# Patient Record
Sex: MALE | Race: WHITE | Employment: OTHER
[De-identification: names, ages, dates, MRNs, and addresses within clinical notes are randomized per-mention and may not be internally consistent; named-entity substitution may affect disease eponyms.]

---

## 2017-12-17 ENCOUNTER — HEALTH MAINTENANCE LETTER (OUTPATIENT)
Age: 67
End: 2017-12-17

## 2019-01-04 ENCOUNTER — OFFICE VISIT (OUTPATIENT)
Dept: SURGERY | Facility: OTHER | Age: 69
End: 2019-01-04
Attending: SURGERY
Payer: COMMERCIAL

## 2019-01-04 VITALS
TEMPERATURE: 97.7 F | BODY MASS INDEX: 25.39 KG/M2 | OXYGEN SATURATION: 96 % | SYSTOLIC BLOOD PRESSURE: 120 MMHG | RESPIRATION RATE: 18 BRPM | HEIGHT: 66 IN | DIASTOLIC BLOOD PRESSURE: 70 MMHG | WEIGHT: 158 LBS | HEART RATE: 89 BPM

## 2019-01-04 DIAGNOSIS — F17.200 TOBACCO DEPENDENCE: Primary | ICD-10-CM

## 2019-01-04 DIAGNOSIS — Z86.0100 HISTORY OF COLONIC POLYPS: ICD-10-CM

## 2019-01-04 DIAGNOSIS — Z71.6 ENCOUNTER FOR TOBACCO USE CESSATION COUNSELING: ICD-10-CM

## 2019-01-04 DIAGNOSIS — Z12.11 SPECIAL SCREENING FOR MALIGNANT NEOPLASMS, COLON: ICD-10-CM

## 2019-01-04 PROCEDURE — 99203 OFFICE O/P NEW LOW 30 MIN: CPT | Performed by: SURGERY

## 2019-01-04 RX ORDER — SODIUM, POTASSIUM,MAG SULFATES 17.5-3.13G
2 SOLUTION, RECONSTITUTED, ORAL ORAL ONCE
Qty: 2 BOTTLE | Refills: 0 | Status: SHIPPED | OUTPATIENT
Start: 2019-01-04 | End: 2019-01-04

## 2019-01-04 ASSESSMENT — PAIN SCALES - GENERAL: PAINLEVEL: NO PAIN (0)

## 2019-01-04 ASSESSMENT — MIFFLIN-ST. JEOR: SCORE: 1434.43

## 2019-01-04 NOTE — PROGRESS NOTES
Surgery Consult Clinic Note      RE: Nguyễn Kiran  : 1950    Chief Complaint:  colonoscopy    History of Present Illness:  Mr. Kiran is a very pleasant 68 year old male who I am [facility] colon malignant neoplasm and consideration for colonoscopy.  He denies family history of colon or rectal cancer, blood in stool, changes in bowel habits, weight loss, abdominal pain. Last colonoscopy was in  and positive for 3 tubular adenomas (one small one on the right, one small one on the left and one large on in the rectum).  Abdominal surgeries include inguinal and abdominal hernia surgeries. He was diagnosed with uncomplicated diverticulitis in  and treated with oral antibiotics.  He specifically denies fever, chills, nausea, vomiting, chest pain, shortness of breath or palpitations.      Medical history:  Past Medical History:   Diagnosis Date     Nicotine dependence      Rectal bleeding        Surgical history:  Past Surgical History:   Procedure Laterality Date     ARTHROSCOPY SHOULDER ROTATOR CUFF REPAIR Right 2007     BACK SURGERY  1977     COLONOSCOPY N/A 10/6/2014    Procedure: COLONOSCOPY;  Surgeon: Talisha Hodges MD;  Location: [facility]     HERNIA REPAIR      right inguinal hernia repair     HERNIA REPAIR      site not specified     ORTHOPEDIC SURGERY      right shoulder rotator cuff repair     TONSILLECTOMY         Family history:  Family History   Problem Relation Age of Onset     Heart Disease Mother         heart valve replacement     Diabetes Father      Heart Disease Father         heart attack     Diabetes Brother        Medications:  Prior to Admission medications    Not on File       Allergies:  The patienthas No Known Allergies.  .  Social history:  Social History     Tobacco Use     Smoking status: Current Every Day Smoker     Packs/day: 1.00     Types: Cigarettes     Smokeless tobacco: Never Used     Tobacco comment: Has been  "smoking 45 years   Substance Use Topics     Alcohol use: No     Marital status: .    Review of Systems:    Constitutional: Negative for fever, chills and weight loss.   HENT: Negative for ear pain, nosebleeds, congestion, sore throat, tinnitus and ear discharge.    Eyes: Negative for blurred vision, double vision, photophobia and pain.   Respiratory: Negative for cough, hemoptysis, shortness of breath, wheezing and stridor.    Cardiovascular: Negative for chest pain, palpitations and orthopnea.   Gastrointestinal: Negative for heartburn, nausea, vomiting, abdominal pain and blood in stool.   Genitourinary: Negative for urgency, frequency and hematuria.   Musculoskeletal: Negative for myalgias, back pain and joint pain.   Neurological: Negative for tingling, speech change and headaches.   Endo/Heme/Allergies: Does not bruise/bleed easily.   Psychiatric/Behavioral: Negative for depression, suicidal ideas and hallucinations. The patient is not nervous/anxious.    Physical Examination:  /70 (BP Location: Left arm, Patient Position: Sitting, Cuff Size: Adult Regular)   Pulse 89   Temp 97.7  F (36.5  C) (Tympanic)   Resp 18   Ht 1.676 m (5' 6\")   Wt 71.7 kg (158 lb)   SpO2 96%   BMI 25.50 kg/m    General: AAOx4, NAD, WN/WD, ambulating without assistance  HEENT:NCAT, EOMI, PERRL Sclerae anicteric; Trachea mideline, no JVD  Chest:   Clear to auscultation bilaterally.  Cardiac: S1S2 , regular rate and rhythm without additional sounds  Abdomen: Soft, ND/NT no rebound, no guarding  Rectal: deferred until colonoscopy  Extremities: Cursory exam unremarkable.  Skin: Warm, dry, < 2 sec cap refill  Neuro: CN 2-12 grossly intact, no focal deficit, GCS 15  Psych: happy, calm, asks appropriate questions      Assessment/Plan:  #1 Screening colon malignant neoplasm  #2 Personal history of multiple adenomatous colon polyps  #3 Tobacco dependence     Thank you for the consult.  Mr. Kiran and I had a long and migue " discussion about colonoscopies.  The indications, risks, benefits, althernatives and technical aspects of whole colon colonoscopy were outlined with risks including, but not limited to, perforation, bleeding and inability to visualize entire colon.  Management of each was reviewed including the risk for life saving surgery and possible admittance to the ICU.  The need of mechanical preparation of the colon was reviewed along with the use of monitored anesthetic care which is needed to ensure proper visualization and safety concerns should biopsy be needed.  The patient's questions were asked and answered.  Scheduled first available date.          Dr. Frederic DO, [facility]  [address] [place] [address], [address]  [place]    [id number]    Referring Provider:  No referring provider defined for this encounter.     Primary Care Provider:  No Ref-Primary, Physician

## 2019-01-04 NOTE — PATIENT INSTRUCTIONS
Thank you for allowing Dr De La Garza and our surgical team to participate in your care.  If you have a scheduling or an appointment question please contact Bibi, our Health Unit Coordinator, at her direct line [phone].   ALL nursing questions or concerns can be directed to your surgical nurse at: [phone]    You are scheduled for a: Colonoscopy with possible biopsy   Your procedure date is:  Tuesday, January 15, 2019    You have been ordered Suprep as your mechanical bowel prep. Please pick this up from your preferred pharmacy.     Suprep Bowel Prep Instructions    Clear liquid diet only the day prior to your examination.    Clear liquids include:  Water, tea, coffee, soda pop, gatorade, clear nutritional drinks, jell-o, popsicles (no milk or fruit pieces), sorbet, fat-free soup broth or bouillon, plain hard candy (clear life savers), clear juices and fruit flavored drinks such as apple juice, white grape juice, hi-c and emily-aid.  Please avoid red or purple colors.      Oscar prep - one bottle at 6pm the evening prior to the examination and follow with Two 16 ounce glasses water.    Oscar prep - one bottle 6 hours prior to your arrival time.     You may continue to drink clear liquids until 3 hours prior to your arrival time at the hospital.        HOW TO PREPARE-        You need a friend or family member available to drive you home AND stay with you for 4 hours after you leave the hospital. You will not be allowed to drive yourself. IF you need to take a taxi or the bus you MUST have a responsible person to ride with you. YOUR PROCEDURE WILL BE CANCELLED IF YOU DO NOT HAVE A RESPONSIBLE ADULT TO DRIVE YOU HOME.       You need to call our Surgery Education Nurses 1-2 weeks prior to your surgery date at [phone] or toll free [phone]. Please have you medication and allergy lists ready.       Stop your aspirin or other NSAIDs(Ibuprofen, Motrin, Aleve, Celebrex, Naproxen, etc...) 7 days before your surgery.  Tylenol is safe to take.        Hospital admitting will call you the day before your surgery with your arrival time. If you are scheduled on a Monday admitting will call you the Friday before.      Please call your primary care physician if you should become ill within 24 hours of scheduled surgery. (ex.vomiting, diarrhea, fever)

## 2019-01-04 NOTE — PROGRESS NOTES
"Chief Complaint   Patient presents with     Consult     [facility].  History of polyps.        Initial /70 (BP Location: Left arm, Patient Position: Sitting, Cuff Size: Adult Regular)   Pulse 89   Temp 97.7  F (36.5  C) (Tympanic)   Resp 18   Ht 1.676 m (5' 6\")   Wt 71.7 kg (158 lb)   SpO2 96%   BMI 25.50 kg/m   Estimated body mass index is 25.5 kg/m  as calculated from the following:    Height as of this encounter: 1.676 m (5' 6\").    Weight as of this encounter: 71.7 kg (158 lb).  Medication Reconciliation: complete    Susan Sanders LPN    "

## 2019-01-07 ENCOUNTER — TELEPHONE (OUTPATIENT)
Dept: SURGERY | Facility: OTHER | Age: 69
End: 2019-01-07

## 2019-01-07 DIAGNOSIS — Z12.11 SPECIAL SCREENING FOR MALIGNANT NEOPLASMS, COLON: Primary | ICD-10-CM

## 2019-01-07 RX ORDER — SODIUM, POTASSIUM,MAG SULFATES 17.5-3.13G
1 SOLUTION, RECONSTITUTED, ORAL ORAL SEE ADMIN INSTRUCTIONS
Qty: 2 BOTTLE | Refills: 0 | Status: SHIPPED | OUTPATIENT
Start: 2019-01-07 | End: 2021-05-21

## 2019-01-07 NOTE — TELEPHONE ENCOUNTER
Pt called stating unable to get [facility] except [facility]. States it can be overnighted from pharmacy.    FAX:[phone]    Concha Brito LPN

## 2019-01-08 DIAGNOSIS — Z12.11 SPECIAL SCREENING FOR MALIGNANT NEOPLASMS, COLON: Primary | ICD-10-CM

## 2019-01-14 ENCOUNTER — ANESTHESIA EVENT (OUTPATIENT)
Dept: SURGERY | Facility: HOSPITAL | Age: 69
End: 2019-01-14
Payer: COMMERCIAL

## 2019-01-14 ASSESSMENT — LIFESTYLE VARIABLES: TOBACCO_USE: 1

## 2019-01-14 NOTE — ANESTHESIA PREPROCEDURE EVALUATION
Anesthesia Pre-Procedure Evaluation    Patient: Nguyễn Kiran   MRN: [id number] : 1950          Preoperative Diagnosis: SCREENING    Procedure(s):  COLONOSCOPY    Past Medical History:   Diagnosis Date     Nicotine dependence      Rectal bleeding      Past Surgical History:   Procedure Laterality Date     ARTHROSCOPY SHOULDER ROTATOR CUFF REPAIR Right 2007     BACK SURGERY  1977     COLONOSCOPY N/A 10/6/2014    Procedure: COLONOSCOPY;  Surgeon: Talisha Hodges MD;  Location: [facility]     HERNIA REPAIR      right inguinal hernia repair     HERNIA REPAIR      site not specified     ORTHOPEDIC SURGERY      right shoulder rotator cuff repair     TONSILLECTOMY         Anesthesia Evaluation     . Pt has had prior anesthetic. Type: General and MAC    No history of anesthetic complications          ROS/MED HX    ENT/Pulmonary:     (+)tobacco use, Current use 1 packs/day  mild COPD, , . .    Neurologic:  - neg neurologic ROS     Cardiovascular:     (+) ----. : . . . :. . No previous cardiac testing       METS/Exercise Tolerance:     Hematologic:  - neg hematologic  ROS       Musculoskeletal:   (+) arthritis, , , -       GI/Hepatic:     (+) bowel prep, Other GI/Hepatic hx rectal bleeding      Renal/Genitourinary:  - ROS Renal section negative       Endo:  - neg endo ROS       Psychiatric:  - neg psychiatric ROS       Infectious Disease:  - neg infectious disease ROS       Malignancy:      - no malignancy   Other:    - neg other ROS                      Physical Exam      Airway   Mallampati: III  TM distance: >3 FB  Neck ROM: full    Dental   (+) missing    Cardiovascular   Rhythm and rate: regular and normal      Pulmonary    breath sounds clear to auscultation(+) decreased breath sounds               Lab Results   Component Value Date    WBC 6.7 10/07/2015    HGB 17.2 10/07/2015    HCT 48.8 10/07/2015     10/07/2015     10/07/2015    POTASSIUM 4.1 10/07/2015    CHLORIDE 105 10/07/2015    CO2 29  "10/07/2015    BUN 10 10/07/2015    CR 0.98 10/07/2015     (H) 10/07/2015    KWESI 8.5 10/07/2015       Preop Vitals  BP Readings from Last 3 Encounters:   01/04/19 120/70   10/13/15 110/80   10/07/15 116/100    Pulse Readings from Last 3 Encounters:   01/04/19 89   10/13/15 80   10/07/15 82      Resp Readings from Last 3 Encounters:   01/04/19 18   10/07/15 18   10/06/14 18    SpO2 Readings from Last 3 Encounters:   01/04/19 96%   10/07/15 96%   10/06/14 96%      Temp Readings from Last 1 Encounters:   01/04/19 97.7  F (36.5  C) (Tympanic)    Ht Readings from Last 1 Encounters:   01/04/19 1.676 m (5' 6\")      Wt Readings from Last 1 Encounters:   01/04/19 71.7 kg (158 lb)    Estimated body mass index is 25.5 kg/m  as calculated from the following:    Height as of 1/4/19: 1.676 m (5' 6\").    Weight as of 1/4/19: 71.7 kg (158 lb).       Anesthesia Plan      History & Physical Review  History and physical reviewed and following examination; no interval change.    ASA Status:  3 .        Plan for MAC with Intravenous and Propofol induction. Maintenance will be TIVA.  Reason for MAC:  Chronic cardiopulmonary disease (G9) and Other - see comments  PONV prophylaxis:  Ondansetron (or other 5HT-3)  Hp 1/4/19 berny  Surgeon requests deep sedation. Patient is an ASA 3 and has a Mallampati 3 airway. Will provide MAC.      Postoperative Care  Postoperative pain management:  IV analgesics.      Consents  Anesthetic plan, risks, benefits and alternatives discussed with:  Patient..                 WILDER Cleveladn CRNA  " Anemia

## 2019-01-15 ENCOUNTER — ANESTHESIA (OUTPATIENT)
Dept: SURGERY | Facility: HOSPITAL | Age: 69
End: 2019-01-15
Payer: COMMERCIAL

## 2019-01-15 ENCOUNTER — HOSPITAL ENCOUNTER (OUTPATIENT)
Facility: HOSPITAL | Age: 69
Discharge: HOME | End: 2019-01-15
Attending: SURGERY | Admitting: SURGERY
Payer: COMMERCIAL

## 2019-01-15 VITALS
OXYGEN SATURATION: 97 % | SYSTOLIC BLOOD PRESSURE: 151 MMHG | DIASTOLIC BLOOD PRESSURE: 77 MMHG | TEMPERATURE: 98.1 F | WEIGHT: 158 LBS | RESPIRATION RATE: 18 BRPM | HEIGHT: 66 IN | BODY MASS INDEX: 25.39 KG/M2

## 2019-01-15 PROCEDURE — 45380 COLONOSCOPY AND BIOPSY: CPT | Mod: PT | Performed by: SURGERY

## 2019-01-15 PROCEDURE — 37000009 ZZH ANESTHESIA TECHNICAL FEE, EACH ADDTL 15 MIN: Performed by: SURGERY

## 2019-01-15 PROCEDURE — 25000128 H RX IP 250 OP 636: Performed by: NURSE ANESTHETIST, CERTIFIED REGISTERED

## 2019-01-15 PROCEDURE — 01999 UNLISTED ANES PROCEDURE: CPT | Performed by: NURSE ANESTHETIST, CERTIFIED REGISTERED

## 2019-01-15 PROCEDURE — 36000052 ZZH SURGERY LEVEL 2 EA 15 ADDTL MIN: Performed by: SURGERY

## 2019-01-15 PROCEDURE — 40000305 ZZH STATISTIC PRE PROC ASSESS I: Performed by: SURGERY

## 2019-01-15 PROCEDURE — 36000050 ZZH SURGERY LEVEL 2 1ST 30 MIN: Performed by: SURGERY

## 2019-01-15 PROCEDURE — 45385 COLONOSCOPY W/LESION REMOVAL: CPT | Performed by: ANESTHESIOLOGY

## 2019-01-15 PROCEDURE — 88305 TISSUE EXAM BY PATHOLOGIST: CPT | Mod: TC | Performed by: SURGERY

## 2019-01-15 PROCEDURE — 71000027 ZZH RECOVERY PHASE 2 EACH 15 MINS: Performed by: SURGERY

## 2019-01-15 PROCEDURE — 25000125 ZZHC RX 250: Performed by: NURSE ANESTHETIST, CERTIFIED REGISTERED

## 2019-01-15 PROCEDURE — 45385 COLONOSCOPY W/LESION REMOVAL: CPT | Mod: PT | Performed by: SURGERY

## 2019-01-15 PROCEDURE — 37000008 ZZH ANESTHESIA TECHNICAL FEE, 1ST 30 MIN: Performed by: SURGERY

## 2019-01-15 PROCEDURE — 27210794 ZZH OR GENERAL SUPPLY STERILE: Performed by: SURGERY

## 2019-01-15 RX ORDER — ONDANSETRON 2 MG/ML
4 INJECTION INTRAMUSCULAR; INTRAVENOUS EVERY 30 MIN PRN
Status: DISCONTINUED | OUTPATIENT
Start: 2019-01-15 | End: 2019-01-15 | Stop reason: HOSPADM

## 2019-01-15 RX ORDER — SODIUM CHLORIDE, SODIUM LACTATE, POTASSIUM CHLORIDE, CALCIUM CHLORIDE 600; 310; 30; 20 MG/100ML; MG/100ML; MG/100ML; MG/100ML
INJECTION, SOLUTION INTRAVENOUS CONTINUOUS
Status: DISCONTINUED | OUTPATIENT
Start: 2019-01-15 | End: 2019-01-15 | Stop reason: HOSPADM

## 2019-01-15 RX ORDER — LIDOCAINE HYDROCHLORIDE 20 MG/ML
INJECTION, SOLUTION INFILTRATION; PERINEURAL PRN
Status: DISCONTINUED | OUTPATIENT
Start: 2019-01-15 | End: 2019-01-15

## 2019-01-15 RX ORDER — ONDANSETRON 4 MG/1
4 TABLET, ORALLY DISINTEGRATING ORAL EVERY 30 MIN PRN
Status: DISCONTINUED | OUTPATIENT
Start: 2019-01-15 | End: 2019-01-15 | Stop reason: HOSPADM

## 2019-01-15 RX ORDER — PROPOFOL 10 MG/ML
INJECTION, EMULSION INTRAVENOUS PRN
Status: DISCONTINUED | OUTPATIENT
Start: 2019-01-15 | End: 2019-01-15

## 2019-01-15 RX ORDER — NALOXONE HYDROCHLORIDE 0.4 MG/ML
.1-.4 INJECTION, SOLUTION INTRAMUSCULAR; INTRAVENOUS; SUBCUTANEOUS
Status: DISCONTINUED | OUTPATIENT
Start: 2019-01-15 | End: 2019-01-15 | Stop reason: HOSPADM

## 2019-01-15 RX ORDER — LIDOCAINE 40 MG/G
CREAM TOPICAL
Status: DISCONTINUED | OUTPATIENT
Start: 2019-01-15 | End: 2019-01-15 | Stop reason: HOSPADM

## 2019-01-15 RX ORDER — MEPERIDINE HYDROCHLORIDE 50 MG/ML
12.5 INJECTION INTRAMUSCULAR; INTRAVENOUS; SUBCUTANEOUS
Status: DISCONTINUED | OUTPATIENT
Start: 2019-01-15 | End: 2019-01-15 | Stop reason: HOSPADM

## 2019-01-15 RX ADMIN — PROPOFOL 50 MG: 10 INJECTION, EMULSION INTRAVENOUS at 09:24

## 2019-01-15 RX ADMIN — PROPOFOL 50 MG: 10 INJECTION, EMULSION INTRAVENOUS at 09:19

## 2019-01-15 RX ADMIN — PROPOFOL 20 MG: 10 INJECTION, EMULSION INTRAVENOUS at 09:37

## 2019-01-15 RX ADMIN — PROPOFOL 20 MG: 10 INJECTION, EMULSION INTRAVENOUS at 09:41

## 2019-01-15 RX ADMIN — PROPOFOL 20 MG: 10 INJECTION, EMULSION INTRAVENOUS at 09:43

## 2019-01-15 RX ADMIN — PROPOFOL 20 MG: 10 INJECTION, EMULSION INTRAVENOUS at 09:48

## 2019-01-15 RX ADMIN — SODIUM CHLORIDE, POTASSIUM CHLORIDE, SODIUM LACTATE AND CALCIUM CHLORIDE: 600; 310; 30; 20 INJECTION, SOLUTION INTRAVENOUS at 09:10

## 2019-01-15 RX ADMIN — PROPOFOL 50 MG: 10 INJECTION, EMULSION INTRAVENOUS at 09:30

## 2019-01-15 RX ADMIN — PROPOFOL 20 MG: 10 INJECTION, EMULSION INTRAVENOUS at 09:51

## 2019-01-15 RX ADMIN — PROPOFOL 20 MG: 10 INJECTION, EMULSION INTRAVENOUS at 09:39

## 2019-01-15 RX ADMIN — PROPOFOL 20 MG: 10 INJECTION, EMULSION INTRAVENOUS at 09:45

## 2019-01-15 RX ADMIN — LIDOCAINE HYDROCHLORIDE 40 MG: 20 INJECTION, SOLUTION INFILTRATION; PERINEURAL at 09:19

## 2019-01-15 RX ADMIN — PROPOFOL 50 MG: 10 INJECTION, EMULSION INTRAVENOUS at 09:34

## 2019-01-15 ASSESSMENT — COPD QUESTIONNAIRES
COPD: 1
CAT_SEVERITY: MILD

## 2019-01-15 ASSESSMENT — MIFFLIN-ST. JEOR: SCORE: 1434.43

## 2019-01-15 NOTE — DISCHARGE INSTRUCTIONS
INSTRUCTIONS AFTER COLONOSCOPY    WHEN YOU ARE BACK HOME:    Plan to rest for an hour or two after you get home.    You may have some cramping or pressure until you pass gas.    You may resume your regular medications.    Eat a small, light meal at first, and then gradually return to normal meal sizes.  If you had a polyp removed:    Slight bleeding may occur.  You may have a slight blood stain on the toilet paper after a bowel movement.    To lessen the chance of bleeding, avoid heavy exercise for ONE WEEK.  This includes heavy lifting, vigorous sport activities, and heavy physical labor.  You may resume your normal sexual activity.      Avoid aspirin or aspirin products if instructed by your doctor.    WHAT TO WATCH FOR:  Problems rarely occur after the exam; however, it is important for you to watch for early signs of possible problems.  If you have     Unusual pain in your abdomen    Nausea and vomiting that persists    Excessive bleeding    Black or bloody bowel movements    Fever or temperature above 100.6 F  Please call your doctor ([facility] [phone]) or go to the nearest hospital emergency room.    Post-Anesthesia Patient Instructions    IMMEDIATELY FOLLOWING SURGERY:  Do not drive or operate machinery for the first twenty four hours after surgery.  Do not make any important decisions for twenty four hours after surgery or while taking narcotic pain medications or sedatives.  If you develop intractable nausea and vomiting or a severe headache please notify your doctor immediately.    FOLLOW-UP:  Please make an appointment with your surgeon as instructed. You do not need to follow up with anesthesia unless specifically instructed to do so.    WOUND CARE INSTRUCTIONS (if applicable):  Keep a dry clean dressing on the anesthesia/puncture wound site if there is drainage.  Once the wound has quit draining you may leave it open to air.  Generally you should leave the bandage intact for twenty four  hours unless there is drainage.  If the epidural site drains for more than 36-48 hours please call the anesthesia department.    QUESTIONS?:  Please feel free to call your physician or the hospital  if you have any questions, and they will be happy to assist you.

## 2019-01-15 NOTE — ANESTHESIA CARE TRANSFER NOTE
Patient: Nguyễn Kiran    Procedure(s):  COLONOSCOPY with Polypectomy    Diagnosis: SCREENING  Diagnosis Additional Information: No value filed.    Anesthesia Type:   MAC     Note:  Airway :Nasal Cannula  Patient transferred to:Phase II  Handoff Report: Identifed the Patient, Identified the Reponsible Provider, Reviewed the pertinent medical history, Discussed the surgical course, Reviewed Intra-OP anesthesia mangement and issues during anesthesia, Set expectations for post-procedure period and Allowed opportunity for questions and acknowledgement of understanding      Vitals: (Last set prior to Anesthesia Care Transfer)    CRNA VITALS  1/15/2019 0926 - 1/15/2019 1001      1/15/2019             EKG:  Sinus rhythm                Electronically Signed By: WILDER Zuniga CRNA  January 15, 2019  10:01 AM

## 2019-01-15 NOTE — OR NURSING
"Patient discharged to home.  Joey score 20/20. Pain level 0/10.  Discharged from unit via w/c.  Pt given \"Up to Date\" patient information on diverticulosis along with discharge instructions, wife and pt understand plan of care.     "

## 2019-01-15 NOTE — ANESTHESIA POSTPROCEDURE EVALUATION
Patient: Nguyễn Kiran    Procedure(s):  COLONOSCOPY with Polypectomy    Diagnosis:SCREENING  Diagnosis Additional Information: No value filed.    Anesthesia Type:  MAC    Note:  Anesthesia Post Evaluation    Patient location during evaluation: Phase 2 and Bedside  Patient participation: Able to fully participate in evaluation  Level of consciousness: awake and alert  Pain management: adequate  Airway patency: patent  Cardiovascular status: acceptable  Respiratory status: acceptable  Hydration status: stable  PONV: none     Anesthetic complications: None          Last vitals:  Vitals:    01/15/19 1025 01/15/19 1030 01/15/19 1035   BP: 125/79 126/77 118/77   Resp:  16    Temp:      SpO2: 100% 99%          Electronically Signed By: Gato Grover MD  January 15, 2019  10:58 AM

## 2019-01-16 LAB — COPATH REPORT: NORMAL

## 2019-01-25 ENCOUNTER — TELEPHONE (OUTPATIENT)
Dept: SURGERY | Facility: OTHER | Age: 69
End: 2019-01-25

## 2019-01-25 DIAGNOSIS — Z12.11 SPECIAL SCREENING FOR MALIGNANT NEOPLASMS, COLON: Primary | ICD-10-CM

## 2019-01-25 RX ORDER — BISACODYL 5 MG/1
TABLET, DELAYED RELEASE ORAL
Qty: 4 TABLET | Refills: 0 | Status: SHIPPED | OUTPATIENT
Start: 2019-01-25 | End: 2021-05-21

## 2019-01-25 NOTE — TELEPHONE ENCOUNTER
Patient of Dr De La Garza can your review?      chon and ducolax keyed up for your review and approval  Local print and fax needed

## 2019-01-25 NOTE — TELEPHONE ENCOUNTER
[facility] calling to let us know that they do not carry the sure prep and asking to switch to the go lightly prep. Said have been trying to clarify this since 1/8/19. Please call back at [phone]

## 2020-03-02 ENCOUNTER — HEALTH MAINTENANCE LETTER (OUTPATIENT)
Age: 70
End: 2020-03-02

## 2020-12-20 ENCOUNTER — HEALTH MAINTENANCE LETTER (OUTPATIENT)
Age: 70
End: 2020-12-20

## 2021-04-18 ENCOUNTER — HEALTH MAINTENANCE LETTER (OUTPATIENT)
Age: 71
End: 2021-04-18

## 2021-05-21 ENCOUNTER — PREP FOR PROCEDURE (OUTPATIENT)
Dept: SURGERY | Facility: OTHER | Age: 71
End: 2021-05-21

## 2021-05-21 ENCOUNTER — OFFICE VISIT (OUTPATIENT)
Dept: SURGERY | Facility: OTHER | Age: 71
End: 2021-05-21
Attending: CLINICAL NURSE SPECIALIST
Payer: COMMERCIAL

## 2021-05-21 VITALS
RESPIRATION RATE: 17 BRPM | BODY MASS INDEX: 21.47 KG/M2 | OXYGEN SATURATION: 98 % | WEIGHT: 133 LBS | DIASTOLIC BLOOD PRESSURE: 58 MMHG | SYSTOLIC BLOOD PRESSURE: 98 MMHG | TEMPERATURE: 96.9 F | HEART RATE: 73 BPM

## 2021-05-21 DIAGNOSIS — Z86.0100 PERSONAL HISTORY OF COLONIC POLYPS: Primary | ICD-10-CM

## 2021-05-21 DIAGNOSIS — F17.200 NICOTINE DEPENDENCE, UNCOMPLICATED, UNSPECIFIED NICOTINE PRODUCT TYPE: Primary | ICD-10-CM

## 2021-05-21 DIAGNOSIS — Z12.11 SCREENING FOR COLON CANCER: ICD-10-CM

## 2021-05-21 PROCEDURE — 99203 OFFICE O/P NEW LOW 30 MIN: CPT | Performed by: CLINICAL NURSE SPECIALIST

## 2021-05-21 RX ORDER — ATORVASTATIN CALCIUM 40 MG/1
40 TABLET, FILM COATED ORAL DAILY
COMMUNITY
Start: 2020-10-05

## 2021-05-21 RX ORDER — SODIUM, POTASSIUM,MAG SULFATES 17.5-3.13G
SOLUTION, RECONSTITUTED, ORAL ORAL
Qty: 960 ML | Refills: 0 | Status: SHIPPED | OUTPATIENT
Start: 2021-05-21

## 2021-05-21 ASSESSMENT — PAIN SCALES - GENERAL: PAINLEVEL: NO PAIN (0)

## 2021-05-21 NOTE — H&P (VIEW-ONLY)
RE: Nguyễn Kiran  : 1950  JONNY: 2021      Chief Complaint:  Colon cancer screening  Personal history of colon polyps    History of Present Illness:  I am seeing Nguy[facility]n [facility].  He has had two previous colonoscopies,  (3 tubular adenomas) and  (tubular adenomas).  Of note he also had an uncomplicated diverticulitis in , treated with oral antibiotics.  He denies family history of colon or rectal cancer, blood in stool, changes in bowel habits, weight loss, abdominal pain.  Previous abdominal surgeries include inguinal and abdominal hernias.   He specifically denies fevers, chills, nausea, vomiting, chest pain, shortness of breath, palpitations, sore throat, cough, or generalized feeling ill.   He will be scheduled for COVID 19 PCR test prior to the colonoscopy.    Medical history:  Past Medical History:   Diagnosis Date     Nicotine dependence      Rectal bleeding        Surgical history:  Past Surgical History:   Procedure Laterality Date     ARTHROSCOPY SHOULDER ROTATOR CUFF REPAIR Right 2007     BACK SURGERY  1977     COLONOSCOPY N/A 10/6/2014    Procedure: COLONOSCOPY;  Surgeon: Talisha Hodges MD;  Location: [facility]     COLONOSCOPY N/A 1/15/2019    Procedure: COLONOSCOPY with Polypectomy;  Surgeon: Nguyễn De La Garza DO;  Location: [facility]     HERNIA REPAIR      right inguinal hernia repair     HERNIA REPAIR      site not specified     ORTHOPEDIC SURGERY      right shoulder rotator cuff repair     TONSILLECTOMY         Family history:  Family History   Problem Relation Age of Onset     Heart Disease Mother         heart valve replacement     Diabetes Father      Heart Disease Father         heart attack     Diabetes Brother        Medications:  Prior to Admission medications    Medication Sig Start Date End Date Taking? Authorizing Provider   atorvastatin (LIPITOR) 40 MG tablet Take 40 mg by mouth daily  "Take one-half tablet by mouth everyday for cholesterol 10/5/20  Yes Reported, Patient       Allergies:  The patient has No Known Allergies.  .  Social history:  Social History     Tobacco Use     Smoking status: Current Every Day Smoker     Packs/day: 1.00     Years: 56.00     Pack years: 56.00     Types: Cigarettes     Start date: 1/1/1965     Smokeless tobacco: Never Used     Tobacco comment: patient not wanting to quit, \"now or ever.\"   Substance Use Topics     Alcohol use: No     Marital status: .    Review of Systems:    Constitutional: Negative for fever, chills.   HENT: Negative for ear pain, congestion, sore throat, and ear discharge.    Eyes: Negative for blurred vision, double vision.   Respiratory: Negative for cough, hemoptysis, shortness of breath, wheezing and stridor.    Cardiovascular: Negative for chest pain, palpitations and orthopnea.   Gastrointestinal: Negative for heartburn, nausea, vomiting, abdominal pain and blood in stool.   Genitourinary: Negative for urgency, frequency   Musculoskeletal: Negative for myalgias, back pain and joint pain.   Neurological: Negative for tingling, speech change and headaches.   Endo/Heme/Allergies: Does not bruise/bleed easily.   Psychiatric/Behavioral: Negative for depression, suicidal ideas and hallucinations. The patient is not nervous/anxious.    Physical Examination:  BP 98/58 (BP Location: Right arm, Patient Position: Chair, Cuff Size: Adult Regular)   Pulse 73   Temp 96.9  F (36.1  C) (Tympanic)   Resp 17   Wt 60.3 kg (133 lb)   SpO2 98%   BMI 21.47 kg/m    General: Alert and orientedx4, no acute distress, well-developed/well-nourished, ambulating without assistance  HEENT: normocephalic atraumatic, extraocular movements intact, sclerae anicteric, Trachea mideline  Chest:   Clear to auscultation bilaterally.  Cardiac: S1S2 , regular rate and rhythm without additional sounds  Abdomen: Soft, non-tender, non-distended  Extremities: Cursory exam " unremarkable.  No peripheral edema noted.  Skin: Warm, dry, < 2 sec cap refill  Neuro: CN 2-12 grossly intact, no focal deficit, GCS 15  Psych: Pleasant, calm, asks appropriate questions      Assessment/Plan:  #1 Colonoscopy  #2 Personal history of tubular adenomas    Nguyễn Kiran and I had a discussion about colonoscopies.  The indications, risks, benefits, althernatives and technical aspects of whole colon colonoscopy were outlined with risks including, but not limited to, perforation, bleeding and inability to visualize entire colon.  Management of each was reviewed including the risk for life saving surgery and possible admittance to the hospital.  His questions were asked and answered.  We will proceed with scheduling colonoscopy with Dr. Mishra.    Genet Hua [facility]  [address] [place] [address]    Referring Provider:  [facility]  [address]  [address] [place] [address]     Primary Care Provider:  Ruba Ref-Primary, Physician

## 2021-05-21 NOTE — PATIENT INSTRUCTIONS
Thank you for allowing SAMUEL Caballero and our surgical team to participate in your care. Please call our health unit coordinator at [phone] with scheduling questions or the nurse at [phone] with any other questions or concerns.    We want your Colonoscopy to be as pleasant as possible. Please review the instructions below. If you have any questions, please contact us at any of the following numbers:     Clinic Health Unit Coordinator: [phone]  Clinic Nurse (Crystal): [phone]  Surgery Education Nurse: [phone]    Date of Procedure: 6/17/2021 with Dr. Mishra  Admit time: Hospital Surgery will call you the day before your procedure by 5pm with your arrival time. If your surgery is on Monday, expect a call on Friday.  If you are not contacted by 5 pm you may call admitting at [phone].   After hours or on weekends, call [phone] to postpone.   Call the clinic nurse if you become ill within 1 week of your procedure to reschedule.     COVID-19 test is needed 4-5 days before procedure in the morning. This testing is done in the [facility] on the West side of [facility] (weekdays and weekends) or in the [facility] at the [facility] [place] (weekdays only).  Follow the signage for parking and bring your mobile phone if you have one to call the phone number on the sign outside the testing site for check-in. If you do not have a cell phone, please call the nurse for instructions on checking in.   This has been scheduled for 6/12/2021 at 10:30 at the main clinic site.      7 DAYS BEFORE THE EXAM:  6/10/2021   prescriptions at your pharmacy as soon as possible if not already done. (call ahead if more than 1 week)  Call the Surgery Education Nurse at [phone] and have a medication list ready.   Do not take Aspirin or NSAIDS (Ibuprofen, Celebrex, Naproxen, etc) 7 days before surgery.   Stop fiber supplements, vitamins, iron, and herbals. Do not eat any corn, nuts  or seeds.   You may continue your 81 mg Aspirin. If you are prescribed blood thinners or insulin, talk to your primary care provider instructions on these medications.    2 DAYS BEFORE THE EXAM:  6/15/2021  Low fiber diet. See table at end of instructions for list of low fiber foods.  Drink 4-6 large glasses of sports drink today and tomorrow. Avoid red and purple.    1 DAY BEFORE THE EXAM:  6/16.2021  No solid food or milk products after 12:00 am, midnight.   Drink only clear liquids all day-at least 8-10 glasses, including 4-6 glasses sports drink.   See table at end of instructions for list of clear liquids. No red, purple or alcohol.          AT 6:00 PM THE EVENING PRIOR TO PROCEDURE:  Pour one bottle of SuPrep liquid into the mixing container. Add cool water to the 16 oz line, mix and drink. Follow with two 16 oz. glasses of water in the next hour. Stay near a toilet.    DAY OF COLONOSCOPY:  6/17/2021          6 HOURS PRIOR TO THE EXAM (set an alarm):  Repeat the previous instructions with the 2nd bottle of SuPrep followed by 2 glasses of water.   Continue clear liquids until 2 hours prior to exam. If you must take medication, take it with a sip of water.  Do not take diabetes medicine by mouth until after your exam.   If you have asthma,bring your inhaler to surgery.  Shower before arrival and wear clean, comfortable clothes.   No jewelry, make-up, nail polish, hairspray, lotions, or perfumes.   Sharon in Admitting through the [facility].  You must have a responsible adult to drive and stay with you for 4 hours at home or you will be cancelled.       Low Fiber Diet    You may have: Do NOT have:   Starches:        White breads (tortillas, biscuits, toast, pancakes, waffles, etc.), white rice, pasta (not whole grain), cooked and peeled potatoes, plain or saltine crackers, cooked farina or cream of rice, puffed rice, corn flakes, Rice Krispies, Special K.   Starches:       Whole grain breads; Breads  containing nuts, seeds or fruit, or more than 1 gram fiber per slice, cornbread, corn or whole wheat tortillas, potatoes with skin, brown rice, wild rice, kasha/buckwheat.   Vegetables:       Tender, well cooked and canned vegetables without seeds or peels including carrots, asparagus tips, green beans, wax  beans, spinach; vegetable broth Vegetables:       Any raw or steamed vegetables, vegetables with seeds, corn in any form   Fruits/Juices:        Strained fruit juice, canned fruit without seeds or skin (not pineapple,) applesauce, pear, ripe bananas, melons (not watermelon) Fruit/Juices:        Prunes, prune juice, raisins or other dried fruits, berries and other fruits with seeds, pineapple, fresh or frozen fruits not listed in other column   Milk Products:       Milk, cheese, cottage cheese, yogurt without berries, custard, ice cream without nuts/fruit Milk Products:       Any dairy product with nuts, seeds or berries   Proteins:       Tender, well-cooked ground beef, lamb, veal, ham, pork, chicken, turkey, fish or organ meats; eggs, creamy peanut butter Proteins:        Tough, fibrous meats with gristle, cooked dried beans, peas or lentils, crunchy peanut butter          Fats and condiments:        Margarine, butter, oils, clifford, sour cream, salad dressing, plain gravy, spices, cooked herbs, sugar, clear jelly, honey, syrup Fats and condiments:        Pickles, olives, relish, horseradish, jam, marmalade, preserves   Snacks, sweets and drinks:       Pretzels, hard candy, plain cakes and cookies without nuts or seeds, gelatin, plain pudding, sherbet, popsicles, coffee, tea, carbonated beverages Snacks, sweets and drinks:       Popcorn, nuts, seeds, granola, coconut, candies or desserts with nuts/seeds and raisins/dried fruits or whole grains.       Clear Liquid Diet  You may have: Do NOT have:     ? Tea, coffee (no cream)   Milk or milk products such as ice cream, malts or shakes     ? Water, Vitamin Water,  Smart Water, Coconut Water, PowerAde, Propel, Soda pop, (Sprite, 7 UP, Ginger Ale, Gatorade (not red or purple)   Red or purple drinks of any kind such as  Cranberry juice or grape juice.     ? Clear nutrition drinks (Resource Breeze, Ensure Active protein drink (peach flavor)   Red or purple Jell-O, Popsicles, Yovanny-Aid, Sorbet and candy.     ? Jell-O, Popsicles (no milk or fruit pieces) or Italian ice (not red or purple)   Juices with pulp such as orange, grapefruit, pineapple or tomato juice     ? Water, Vitamin Water, Smart Water, Coconut Water   Cream soups of any kind     ? Fat-free soup broth or bouillon   Alcohol     ? Plain hard candy, such as clear Life Savers (not red or purple)      ? Powdered Lemonade such as Crystal Light, Country Time      ? Clear juices and fruit-flavored drinks such as apple juice, white grape juice, Hi-C and Yovanny-Aid (not red or purple)      ? Honey / Sugar          TIPS FOR COLON CLEANSING BEFORE YOUR COLONOSCOPY  To get accurate results from your exam, your colon must be completely empty or you may need to repeat the colon prep and exam. If you followed instructions and your stool is clear or yellow liquid, you are ready. If you are not sure if your colon is clean, please call the clinic nurse.     You may use tucks wipes, hemorrhoid treatments, hydrocortisone cream, or alcohol-free baby wipes to ease anal irritation. You may also use Vaseline to help protect the skin.     You will have loose watery stools and may also have chills. Expect to feel discomfort, bloating and nausea until the stool clears from your colon. Dress for comfort.     If SuPrep is not covered by insurance and you would like an alternate prep, you or your pharmacy may call the nurse to request a new prescription. The dietary instructions are the same for both preps. Take Dulcolax 5mg at bedtime 2 nights before procedure and 3pm day before exam. Drink 1/2 of the the Golytely at 6pm day before exam 1  8 oz glass  every 15 minutes. Repeat with 2nd 1/2 of Golytely 6 hours prior to exam.

## 2021-05-21 NOTE — NURSING NOTE
"Chief Complaint   Patient presents with     Consult     cons[facility]       Initial BP 98/58 (BP Location: Right arm, Patient Position: Chair, Cuff Size: Adult Regular)   Pulse 73   Temp 96.9  F (36.1  C) (Tympanic)   Resp 17   Wt 60.3 kg (133 lb)   SpO2 98%   BMI 21.47 kg/m   Estimated body mass index is 21.47 kg/m  as calculated from the following:    Height as of 1/15/19: 1.676 m (5' 6\").    Weight as of this encounter: 60.3 kg (133 lb).  Medication Reconciliation: complete  Karishma Mendez LPN    "

## 2021-05-24 PROBLEM — Z86.0100 PERSONAL HISTORY OF COLONIC POLYPS: Status: ACTIVE | Noted: 2021-05-24

## 2021-06-08 ENCOUNTER — ANESTHESIA EVENT (OUTPATIENT)
Dept: SURGERY | Facility: HOSPITAL | Age: 71
End: 2021-06-08
Payer: COMMERCIAL

## 2021-06-08 ASSESSMENT — LIFESTYLE VARIABLES: TOBACCO_USE: 1

## 2021-06-08 NOTE — ANESTHESIA PREPROCEDURE EVALUATION
"Anesthesia Pre-Procedure Evaluation    Patient: Nguyễn Kiran   MRN: [id number] : 1950        Preoperative Diagnosis: Personal history of colonic polyps [Z86.010]   Procedure : Procedure(s):  colonoscopy, possible biopsy, possible polypectomy     Past Medical History:   Diagnosis Date     Nicotine dependence      Rectal bleeding       Past Surgical History:   Procedure Laterality Date     ARTHROSCOPY SHOULDER ROTATOR CUFF REPAIR Right 2007     BACK SURGERY  1977     COLONOSCOPY N/A 10/6/2014    Procedure: COLONOSCOPY;  Surgeon: Talisha Hodges MD;  Location: [facility] OR     COLONOSCOPY N/A 1/15/2019    Procedure: COLONOSCOPY with Polypectomy;  Surgeon: Nguyễn De La Garza DO;  Location: [facility] OR     HERNIA REPAIR      right inguinal hernia repair     HERNIA REPAIR      site not specified     ORTHOPEDIC SURGERY      right shoulder rotator cuff repair     TONSILLECTOMY        No Known Allergies   Social History     Tobacco Use     Smoking status: Current Every Day Smoker     Packs/day: 1.00     Years: 56.00     Pack years: 56.00     Types: Cigarettes     Start date: 1965     Smokeless tobacco: Never Used     Tobacco comment: patient not wanting to quit, \"now or ever.\"   Substance Use Topics     Alcohol use: No      Wt Readings from Last 1 Encounters:   21 60.3 kg (133 lb)        Anesthesia Evaluation   Pt has had prior anesthetic. Type: General and MAC.        ROS/MED HX  ENT/Pulmonary:     (+) ERIC risk factors, snores loudly, tobacco use, Current use, 1 packs/day,     Neurologic:  - neg neurologic ROS     Cardiovascular:     (+) Dyslipidemia -----    METS/Exercise Tolerance: >4 METS    Hematologic:  - neg hematologic  ROS     Musculoskeletal:  - neg musculoskeletal ROS     GI/Hepatic:     (+) bowel prep,     Renal/Genitourinary:  - neg Renal ROS     Endo:  - neg endo ROS     Psychiatric/Substance Use:  - neg psychiatric ROS     Infectious Disease:  - neg infectious disease ROS     Malignancy:  - " neg malignancy ROS     Other:  - neg other ROS          Physical Exam    Airway        Mallampati: III   TM distance: < 3 FB   Neck ROM: full   Mouth opening: > 3 cm    Respiratory Devices and Support         Dental       (+) missing, upper dentures and lower dentures      Cardiovascular   cardiovascular exam normal          Pulmonary           (+) decreased breath sounds           OUTSIDE LABS:  CBC:   Lab Results   Component Value Date    WBC 6.7 10/07/2015    HGB 17.2 10/07/2015    HCT 48.8 10/07/2015     10/07/2015     BMP:   Lab Results   Component Value Date     10/07/2015    POTASSIUM 4.1 10/07/2015    CHLORIDE 105 10/07/2015    CO2 29 10/07/2015    BUN 10 10/07/2015    CR 0.98 10/07/2015     (H) 10/07/2015     COAGS: No results found for: PTT, INR, FIBR  POC: No results found for: BGM, HCG, HCGS  HEPATIC: No results found for: ALBUMIN, PROTTOTAL, ALT, AST, GGT, ALKPHOS, BILITOTAL, BILIDIRECT, ALBERT  OTHER:   Lab Results   Component Value Date    KWESI 8.5 10/07/2015       Anesthesia Plan    ASA Status:  2   NPO Status:  NPO Appropriate    Anesthesia Type: MAC.     - Reason for MAC: chronic cardiopulmonary disease, straight local not clinically adequate   Induction: Intravenous, Propofol.   Maintenance: TIVA.        Consents    Anesthesia Plan(s) and associated risks, benefits, and realistic alternatives discussed. Questions answered and patient/representative(s) expressed understanding.     - Discussed with:  Patient         Postoperative Care            Comments:    Risks and benefits of MAC anesthetic discussed including dental damage, aspiration, loss of airway, conversion to general anesthetic, CV complications, MI, stroke, death. Pt wishes to proceed.    5-21-21            WILDER Evans CRNA

## 2021-06-13 ENCOUNTER — OFFICE VISIT (OUTPATIENT)
Dept: FAMILY MEDICINE | Facility: OTHER | Age: 71
End: 2021-06-13
Attending: SURGERY
Payer: COMMERCIAL

## 2021-06-13 DIAGNOSIS — Z12.11 SCREENING FOR COLON CANCER: ICD-10-CM

## 2021-06-13 LAB
LABORATORY COMMENT REPORT: NORMAL
SARS-COV-2 RNA RESP QL NAA+PROBE: NEGATIVE
SPECIMEN SOURCE: NORMAL

## 2021-06-13 PROCEDURE — U0005 INFEC AGEN DETEC AMPLI PROBE: HCPCS

## 2021-06-13 PROCEDURE — U0003 INFECTIOUS AGENT DETECTION BY NUCLEIC ACID (DNA OR RNA); SEVERE ACUTE RESPIRATORY SYNDROME CORONAVIRUS 2 (SARS-COV-2) (CORONAVIRUS DISEASE [COVID-19]), AMPLIFIED PROBE TECHNIQUE, MAKING USE OF HIGH THROUGHPUT TECHNOLOGIES AS DESCRIBED BY CMS-2020-01-R: HCPCS | Mod: ZL | Performed by: CLINICAL NURSE SPECIALIST

## 2021-06-13 PROCEDURE — U0005 INFEC AGEN DETEC AMPLI PROBE: HCPCS | Mod: ZL | Performed by: CLINICAL NURSE SPECIALIST

## 2021-06-13 PROCEDURE — U0003 INFECTIOUS AGENT DETECTION BY NUCLEIC ACID (DNA OR RNA); SEVERE ACUTE RESPIRATORY SYNDROME CORONAVIRUS 2 (SARS-COV-2) (CORONAVIRUS DISEASE [COVID-19]), AMPLIFIED PROBE TECHNIQUE, MAKING USE OF HIGH THROUGHPUT TECHNOLOGIES AS DESCRIBED BY CMS-2020-01-R: HCPCS

## 2021-06-17 ENCOUNTER — HOSPITAL ENCOUNTER (OUTPATIENT)
Facility: HOSPITAL | Age: 71
Discharge: HOME | End: 2021-06-17
Attending: SURGERY | Admitting: SURGERY
Payer: COMMERCIAL

## 2021-06-17 ENCOUNTER — ANESTHESIA (OUTPATIENT)
Dept: SURGERY | Facility: HOSPITAL | Age: 71
End: 2021-06-17
Payer: COMMERCIAL

## 2021-06-17 VITALS
TEMPERATURE: 98 F | RESPIRATION RATE: 16 BRPM | DIASTOLIC BLOOD PRESSURE: 87 MMHG | HEART RATE: 68 BPM | SYSTOLIC BLOOD PRESSURE: 119 MMHG | OXYGEN SATURATION: 98 %

## 2021-06-17 DIAGNOSIS — Z86.0100 PERSONAL HISTORY OF COLONIC POLYPS: ICD-10-CM

## 2021-06-17 PROCEDURE — 710N000012 HC RECOVERY PHASE 2, PER MINUTE: Performed by: SURGERY

## 2021-06-17 PROCEDURE — 370N000017 HC ANESTHESIA TECHNICAL FEE, PER MIN: Performed by: SURGERY

## 2021-06-17 PROCEDURE — 999N000141 HC STATISTIC PRE-PROCEDURE NURSING ASSESSMENT: Performed by: SURGERY

## 2021-06-17 PROCEDURE — G0105 COLORECTAL SCRN; HI RISK IND: HCPCS | Performed by: SURGERY

## 2021-06-17 PROCEDURE — 258N000003 HC RX IP 258 OP 636: Performed by: NURSE ANESTHETIST, CERTIFIED REGISTERED

## 2021-06-17 PROCEDURE — 250N000011 HC RX IP 250 OP 636: Performed by: NURSE ANESTHETIST, CERTIFIED REGISTERED

## 2021-06-17 PROCEDURE — 45378 DIAGNOSTIC COLONOSCOPY: CPT | Performed by: NURSE ANESTHETIST, CERTIFIED REGISTERED

## 2021-06-17 PROCEDURE — 360N000075 HC SURGERY LEVEL 2, PER MIN: Performed by: SURGERY

## 2021-06-17 RX ORDER — ALBUTEROL SULFATE 0.83 MG/ML
2.5 SOLUTION RESPIRATORY (INHALATION) EVERY 4 HOURS PRN
Status: DISCONTINUED | OUTPATIENT
Start: 2021-06-17 | End: 2021-06-17 | Stop reason: HOSPADM

## 2021-06-17 RX ORDER — ONDANSETRON 2 MG/ML
4 INJECTION INTRAMUSCULAR; INTRAVENOUS EVERY 30 MIN PRN
Status: DISCONTINUED | OUTPATIENT
Start: 2021-06-17 | End: 2021-06-17 | Stop reason: HOSPADM

## 2021-06-17 RX ORDER — NALOXONE HYDROCHLORIDE 0.4 MG/ML
0.2 INJECTION, SOLUTION INTRAMUSCULAR; INTRAVENOUS; SUBCUTANEOUS
Status: DISCONTINUED | OUTPATIENT
Start: 2021-06-17 | End: 2021-06-17 | Stop reason: HOSPADM

## 2021-06-17 RX ORDER — ONDANSETRON 4 MG/1
4 TABLET, ORALLY DISINTEGRATING ORAL EVERY 30 MIN PRN
Status: DISCONTINUED | OUTPATIENT
Start: 2021-06-17 | End: 2021-06-17 | Stop reason: HOSPADM

## 2021-06-17 RX ORDER — MEPERIDINE HYDROCHLORIDE 25 MG/ML
12.5 INJECTION INTRAMUSCULAR; INTRAVENOUS; SUBCUTANEOUS
Status: DISCONTINUED | OUTPATIENT
Start: 2021-06-17 | End: 2021-06-17 | Stop reason: HOSPADM

## 2021-06-17 RX ORDER — NALOXONE HYDROCHLORIDE 0.4 MG/ML
0.4 INJECTION, SOLUTION INTRAMUSCULAR; INTRAVENOUS; SUBCUTANEOUS
Status: DISCONTINUED | OUTPATIENT
Start: 2021-06-17 | End: 2021-06-17 | Stop reason: HOSPADM

## 2021-06-17 RX ORDER — HYDROMORPHONE HYDROCHLORIDE 1 MG/ML
.3-.5 INJECTION, SOLUTION INTRAMUSCULAR; INTRAVENOUS; SUBCUTANEOUS EVERY 10 MIN PRN
Status: DISCONTINUED | OUTPATIENT
Start: 2021-06-17 | End: 2021-06-17 | Stop reason: HOSPADM

## 2021-06-17 RX ORDER — SODIUM CHLORIDE, SODIUM LACTATE, POTASSIUM CHLORIDE, CALCIUM CHLORIDE 600; 310; 30; 20 MG/100ML; MG/100ML; MG/100ML; MG/100ML
INJECTION, SOLUTION INTRAVENOUS CONTINUOUS
Status: DISCONTINUED | OUTPATIENT
Start: 2021-06-17 | End: 2021-06-17 | Stop reason: HOSPADM

## 2021-06-17 RX ORDER — PROPOFOL 10 MG/ML
INJECTION, EMULSION INTRAVENOUS PRN
Status: DISCONTINUED | OUTPATIENT
Start: 2021-06-17 | End: 2021-06-17

## 2021-06-17 RX ORDER — FENTANYL CITRATE 50 UG/ML
25-50 INJECTION, SOLUTION INTRAMUSCULAR; INTRAVENOUS
Status: DISCONTINUED | OUTPATIENT
Start: 2021-06-17 | End: 2021-06-17 | Stop reason: HOSPADM

## 2021-06-17 RX ORDER — FLUMAZENIL 0.1 MG/ML
0.2 INJECTION, SOLUTION INTRAVENOUS
Status: DISCONTINUED | OUTPATIENT
Start: 2021-06-17 | End: 2021-06-17 | Stop reason: HOSPADM

## 2021-06-17 RX ADMIN — PROPOFOL 30 MG: 10 INJECTION, EMULSION INTRAVENOUS at 12:32

## 2021-06-17 RX ADMIN — PROPOFOL 20 MG: 10 INJECTION, EMULSION INTRAVENOUS at 12:34

## 2021-06-17 RX ADMIN — PROPOFOL 50 MG: 10 INJECTION, EMULSION INTRAVENOUS at 12:28

## 2021-06-17 RX ADMIN — SODIUM CHLORIDE, POTASSIUM CHLORIDE, SODIUM LACTATE AND CALCIUM CHLORIDE: 600; 310; 30; 20 INJECTION, SOLUTION INTRAVENOUS at 11:30

## 2021-06-17 RX ADMIN — SODIUM CHLORIDE, POTASSIUM CHLORIDE, SODIUM LACTATE AND CALCIUM CHLORIDE: 600; 310; 30; 20 INJECTION, SOLUTION INTRAVENOUS at 10:56

## 2021-06-17 RX ADMIN — PROPOFOL 50 MG: 10 INJECTION, EMULSION INTRAVENOUS at 12:30

## 2021-06-17 NOTE — OP NOTE
Nguyễn Kiran MRN# [id number]   YOB: 1950 Age: 70 year old      Date of Admission:  6/17/2021  Date of Service:   6/17/21    Primary care provider: No Ref-Primary, Physician    PREOPERATIVE DIAGNOSIS:  Colon cancer screening        POSTOPERATIVE DIAGNOSIS:  Poor prep, no large lesions seen.          PROCEDURE:  Colonoscopy           INDICATIONS:  Screening colonoscopy.      Specimen: * No specimens in log *    SURGEON: Sandoval Mishra MD    DESCRIPTION OF PROCEDURE: Nguyễn Kiran was brought into the endoscopy suite and placed in the left lateral decubitus position. After preprocedural pause and attended monitored anesthesia was administered, the external anus was inspected and was noted to have some skin tags.. Digital rectal exam was normal. The colonoscope was inserted and advanced under direct visualization to the level of the cecum which was identified by the appendiceal orifice and the ileocecal valve. The prep was poor with large chunks and brown liquid. Upon slow withdrawal of the colonoscope, approximately 90% of the mucosa was directly visualized. The  colon was without mucosal abnormality. There was no evidence of further polyps, inflammation, bleeding or AVMs. Retroflexion of the rectum was normal. The extra air was removed from the colon, and the colonoscope withdrawn. The patient tolerated the procedure well and was taken to postanesthesia care unit.     We invite the patient to return in 5 years for follow up screening evaluation, related to poor prep.     Sandoval Mishra MD

## 2021-06-17 NOTE — ANESTHESIA CARE TRANSFER NOTE
Patient: Nguyễn Kiran    Procedure(s):  colonoscopy    Diagnosis: Personal history of colonic polyps [Z86.010]  Diagnosis Additional Information: No value filed.    Anesthesia Type:   MAC     Note:    Oropharynx: spontaneously breathing  Level of Consciousness: awake and drowsy  Oxygen Supplementation: nasal cannula    Independent Airway: airway patency satisfactory and stable  Dentition: dentition unchanged  Vital Signs Stable: post-procedure vital signs reviewed and stable  Report to RN Given: handoff report given  Patient transferred to: Phase II    Handoff Report: Identifed the Patient, Identified the Reponsible Provider, Reviewed the pertinent medical history, Discussed the surgical course, Reviewed Intra-OP anesthesia mangement and issues during anesthesia, Set expectations for post-procedure period and Allowed opportunity for questions and acknowledgement of understanding      Vitals: (Last set prior to Anesthesia Care Transfer)  CRNA VITALS  6/17/2021 1219 - 6/17/2021 1249      6/17/2021             Resp Rate (set):  8        Electronically Signed By: WILDER Zuinga CRNA  June 17, 2021  12:49 PM

## 2021-06-17 NOTE — ANESTHESIA POSTPROCEDURE EVALUATION
Patient: Nguyễn SILVA Qiana    Procedure(s):  colonoscopy    Diagnosis:Personal history of colonic polyps [Z86.010]  Diagnosis Additional Information: No value filed.    Anesthesia Type:  MAC    Note:  Disposition: Outpatient   Postop Pain Control: Uneventful            Sign Out: Well controlled pain   PONV: No   Neuro/Psych: Uneventful            Sign Out: Acceptable/Baseline neuro status   Airway/Respiratory: Uneventful            Sign Out: Acceptable/Baseline resp. status   CV/Hemodynamics: Uneventful            Sign Out: Acceptable CV status; No obvious hypovolemia; No obvious fluid overload   Other NRE: NONE   DID A NON-ROUTINE EVENT OCCUR? No           Last vitals:  Vitals:    06/17/21 1300 06/17/21 1305 06/17/21 1310   BP: 112/66 131/69 125/69   Pulse: 65 72 69   Resp: 16 16 16   Temp:      SpO2: 98% 97% 98%       Last vitals prior to Anesthesia Care Transfer:  CRNA VITALS  6/17/2021 1219 - 6/17/2021 1319      6/17/2021             Resp Rate (set):  8          Electronically Signed By: WILDER [place] CRNA  June 17, 2021  1:24 PM

## 2021-06-17 NOTE — OR NURSING
Patient and responsible adult given discharge instructions with no questions regarding instructions. Joey score 20. Pain level 0/10.  Discharged from unit via ambulation. Patient discharged to home accompanied by wife Gaudencio.

## 2021-06-17 NOTE — DISCHARGE INSTRUCTIONS
You did not have any colon polyps removed today.  We invite you to return in five years for a screening colonoscopy.            INSTRUCTIONS AFTER COLONOSCOPY    WHEN YOU ARE BACK HOME:    Plan to rest for an hour or two after you get home.    You may have some cramping or pressure until you pass gas.    You may resume your regular medications.    Eat a small, light meal at first, and then gradually return to normal meal sizes.    You will be contacted the next day to see how you are doing.  If you had a polyp removed:    Slight bleeding may occur.  You may have a slight blood stain on the toilet paper after a bowel movement.    To lessen the chance of bleeding, avoid heavy exercise for ONE WEEK.  This includes heavy lifting, vigorous sport activities, and heavy physical labor.  You may resume your normal sexual activity.      Avoid aspirin or aspirin products if instructed by your doctor.    If there is a polyp or biopsy, you will be contacted with results within one week.     WHAT TO WATCH FOR:  Problems rarely occur after the exam; however, it is important for you to watch for early signs of possible problems.  If you have     Unusual pain in your abdomen    Nausea and vomiting that persists    Excessive bleeding    Black or bloody bowel movements    Fever or temperature above 100.6 F  Please call your doctor ([facility] [phone]) or go to the nearest hospital emergency room.            Post-Anesthesia Patient Instructions    IMMEDIATELY FOLLOWING SURGERY:  Do not drive or operate machinery for the first twenty four hours after surgery.  Do not make any important decisions for twenty four hours after surgery or while taking narcotic pain medications or sedatives.  If you develop intractable nausea and vomiting or a severe headache please notify your doctor immediately.    FOLLOW-UP:  Please make an appointment with your surgeon as instructed. You do not need to follow up with anesthesia unless specifically  instructed to do so.    WOUND CARE INSTRUCTIONS (if applicable):  Keep a dry clean dressing on the anesthesia/puncture wound site if there is drainage.  Once the wound has quit draining you may leave it open to air.  Generally you should leave the bandage intact for twenty four hours unless there is drainage.  If the epidural site drains for more than 36-48 hours please call the anesthesia department.    QUESTIONS?:  Please feel free to call your physician or the hospital  if you have any questions, and they will be happy to assist you.

## 2021-10-03 ENCOUNTER — HEALTH MAINTENANCE LETTER (OUTPATIENT)
Age: 71
End: 2021-10-03

## 2022-05-14 ENCOUNTER — HEALTH MAINTENANCE LETTER (OUTPATIENT)
Age: 72
End: 2022-05-14

## 2022-09-04 ENCOUNTER — HEALTH MAINTENANCE LETTER (OUTPATIENT)
Age: 72
End: 2022-09-04

## 2023-06-03 ENCOUNTER — HEALTH MAINTENANCE LETTER (OUTPATIENT)
Age: 73
End: 2023-06-03

## (undated) DEVICE — TUBING-SUCTION 20FT

## (undated) DEVICE — IRRIGATION-H2O 1000ML

## (undated) DEVICE — CONNECTOR-ERBEFLO 2 PORT

## (undated) DEVICE — TRAP-POLYP E-TRAP

## (undated) DEVICE — SENSOR-OXISENSOR II ADULT

## (undated) DEVICE — SNARE-ROTATABLE 20MM MINI OVAL

## (undated) DEVICE — FORCEP-COLON BIOPSY LARGE W/NEEDLE 240CM

## (undated) RX ORDER — PROPOFOL 10 MG/ML
INJECTION, EMULSION INTRAVENOUS
Status: DISPENSED
Start: 2019-01-15

## (undated) RX ORDER — PROPOFOL 10 MG/ML
INJECTION, EMULSION INTRAVENOUS
Status: DISPENSED
Start: 2021-06-17

## (undated) RX ORDER — LIDOCAINE HYDROCHLORIDE 20 MG/ML
INJECTION, SOLUTION EPIDURAL; INFILTRATION; INTRACAUDAL; PERINEURAL
Status: DISPENSED
Start: 2019-01-15